# Patient Record
(demographics unavailable — no encounter records)

---

## 2025-01-21 NOTE — HISTORY OF PRESENT ILLNESS
[FreeTextEntry1] : Ms. Duckworth is a 64-year-old female with a PMHx of HTN, HLD, hypothyroidism, and migraines on sumatriptan PRN for many years presents today to establish care with our office for management of migraines. Patient is accompanied with her daughter who is translating during this encounter. Patient states she was diagnosed many years ago with migraines and sumatriptan as needed was working for her. Recently, within the last couple of months, she noticed her migraines were worsening and she has been taking the sumatriptan every day. Although sumatriptan does provide her relief, she is seeking options for preventive tx at this time. Patient endorses her migraine headaches are daily. Located in the temple region and radiates to behind the eye. Associated with photophobia, phonophobia, nausea, and visual disturbances.  Denies dizziness, falls, slurred speech, weakness.   Her last MRI head w/w/o was done in October 2024 which showed no acute pathology.

## 2025-01-21 NOTE — REASON FOR VISIT
[Initial Evaluation] : an initial evaluation [Family Member] : family member [FreeTextEntry1] : migraines

## 2025-01-21 NOTE — ASSESSMENT
[FreeTextEntry1] : Ms. Duckworth is a 64-year-old female with a PMHx of HTN, HLD, hypothyroidism, and migraines on sumatriptan PRN for many years presents today to establish care with our office for management of migraines. Over the last couple of months, migraines have worsened and patient is now taking sumatriptan daily and still experiencing headaches daily. Will start preventive treatment and will switch patient's abortive treatment. MRI head w and w/o Iv contrast reviewed- no acute pathology   Plan: Start Aimvoig 70 mg once a month Switch Abortive therapy to Rizatriptan 10 mg PRN  Advised to start migraine diary  f/u with PCP RTC 3 months

## 2025-05-19 NOTE — ASSESSMENT
[FreeTextEntry1] : STable exam and hearing today Discussion thru son RV 5 months for repeat audiogram Ear pain substantially reduced after changing meds for migraine

## 2025-05-19 NOTE — PHYSICAL EXAM
[Normal] : tympanic membranes are normal in both ears [de-identified] : left  obstructing and impacted cerumen removed under otomicroscopy with microinstrumentation

## 2025-05-19 NOTE — HISTORY OF PRESENT ILLNESS
[FreeTextEntry1] : Patient returns today following up on SNHL, tinnitus of left ear, intractable headaches, clogged ears.  Accompanied by son. Son states that she is going away for 2 months, would like her ears evaluated today. Complaining of occasional fevers and heat flashes. Using Amovig for migraines. No infections since last visit. Denies pain or discomfort in ears.

## 2025-05-19 NOTE — REASON FOR VISIT
[Subsequent Evaluation] : a subsequent evaluation for [FreeTextEntry2] : SNHL, tinnitus of left ear, intractable headaches, clogged ears